# Patient Record
Sex: FEMALE | Race: WHITE | Employment: FULL TIME | URBAN - METROPOLITAN AREA
[De-identification: names, ages, dates, MRNs, and addresses within clinical notes are randomized per-mention and may not be internally consistent; named-entity substitution may affect disease eponyms.]

---

## 2017-02-03 ENCOUNTER — OFFICE VISIT (OUTPATIENT)
Dept: FAMILY MEDICINE CLINIC | Age: 47
End: 2017-02-03

## 2017-02-03 VITALS
WEIGHT: 138.6 LBS | BODY MASS INDEX: 23.09 KG/M2 | TEMPERATURE: 98.6 F | HEIGHT: 65 IN | HEART RATE: 63 BPM | OXYGEN SATURATION: 99 % | DIASTOLIC BLOOD PRESSURE: 80 MMHG | RESPIRATION RATE: 16 BRPM | SYSTOLIC BLOOD PRESSURE: 119 MMHG

## 2017-02-03 DIAGNOSIS — Z76.89 ESTABLISHING CARE WITH NEW DOCTOR, ENCOUNTER FOR: ICD-10-CM

## 2017-02-03 DIAGNOSIS — M65.4 DE QUERVAIN'S TENOSYNOVITIS, RIGHT: Primary | ICD-10-CM

## 2017-02-03 DIAGNOSIS — Z00.00 WELLNESS EXAMINATION: ICD-10-CM

## 2017-02-03 DIAGNOSIS — Z13.220 SCREENING CHOLESTEROL LEVEL: ICD-10-CM

## 2017-02-03 NOTE — PROGRESS NOTES
Chief Complaint   Patient presents with   [facility]     1. Have you been to the ER, urgent care clinic since your last visit? Hospitalized since your last visit? No    2. Have you seen or consulted any other health care providers outside of the [address] [place] [address] since your last visit? Include any pap smears or colon screening.  No

## 2017-02-03 NOTE — PATIENT INSTRUCTIONS
Madhuri Parekh Tenosynovitis: Care Instructions  Your Care Instructions  Madhuri Parekh (say \"Yenni\") tenosynovitis is a problem that makes the bottom of your thumb and the side of your wrist hurt. When you have de Quervain's, the ropey fiber (tendon) that helps move your thumb away from your fingers becomes swollen. You may have pain when you move your wrist or pick things up. You may hear a creaking sound when you move your wrist or thumb. Symptoms often get better in a few weeks with home care. Your doctor may want you to start some gentle stretching exercises once your symptoms are gone. Sometimes treatment with an injection or surgery is needed. Follow-up care is a key part of your treatment and safety. Be sure to make and go to all appointments, and call your doctor if you are having problems. It's also a good idea to know your test results and keep a list of the medicines you take. How can you care for yourself at home? · Until your symptoms are better, stop the activities that caused the pain. · Avoid moving the hand and wrist that hurt. · Follow your doctor's directions for wearing a splint to keep your thumb and wrist from moving. · Try ice or heat. ¨ Put ice or a cold pack on your thumb and wrist for 10 to 20 minutes at a time. Put a thin cloth between the ice and your skin. ¨ You can use heat for 20 to 30 minutes, 2 or 3 times a day. Try using a heating pad, hot shower, or hot pack. · Ask your doctor if you can take an over-the-counter pain medicine, such as acetaminophen (Tylenol), ibuprofen (Advil, Motrin), or naproxen (Aleve). Be safe with medicines. Read and follow all instructions on the label. When should you call for help? Watch closely for changes in your health, and be sure to contact your doctor if:  · You are not getting better after 2 weeks. Where can you learn more? Go to http://farrah-sapphire.info/.   Enter Y519 in the search box to learn more about \"De Quervain's Tenosynovitis: Care Instructions. \"  Current as of: May 23, 2016  Content Version: 11.1  © 2006-2016 [employer]. Care instructions adapted under license by [employer] (which disclaims liability or warranty for this information). If you have questions about a medical condition or this instruction, always ask your healthcare professional. [address] any warranty or liability for your use of this information. Faby Asters Disease: Exercises  Your Care Instructions  Here are some examples of typical rehabilitation exercises for your condition. Start each exercise slowly. Ease off the exercise if you start to have pain. Your doctor or your physical or occupational therapist will tell you when you can start these exercises and which ones will work best for you. How to do the exercises  Thumb lifts    1. Place your hand on a flat surface, with your palm up. 2. Lift your thumb away from your palm to make a \"C\" shape. 3. Hold for about 6 seconds. 4. Repeat 8 to 12 times. Passive thumb MP flexion    1. Hold your hand in front of you, and turn your hand so your little finger faces down and your thumb faces up. (Your hand should be in the position used for shaking someone's hand.) You may also rest your hand on a flat surface. 2. Use the fingers on your other hand to bend your thumb down at the point where your thumb connects to your palm. 3. Hold for at least 15 to 30 seconds. 4. Repeat 2 to 4 times. Finkelstein stretch    1. Hold your arms out in front of you. (Your hand should be in the position used for shaking someone's hand.)  2. Bend your thumb toward your palm. 3. Use your other hand to gently stretch your thumb and wrist downward until you feel the stretch on the thumb side of your wrist.  4. Hold for at least 15 to 30 seconds. 5. Repeat 2 to 4 times.   Resisted ulnar deviation    Note: For this exercise, you will need elastic exercise material, such as surgical tubing or Thera-Band. 1. Sit leaning forward with your legs slightly spread and your elbow on your thigh. 2. Grasp one end of the band with your palm down, and step on the other end with the foot opposite the hand holding the band. 3. Slowly bend your wrist sideways and away from your knee. 4. Repeat 8 to 12 times. Follow-up care is a key part of your treatment and safety. Be sure to make and go to all appointments, and call your doctor if you are having problems. It's also a good idea to know your test results and keep a list of the medicines you take. Where can you learn more? Go to http://farrah-sapphire.info/. Enter G131 in the search box to learn more about \"De Quervain's Disease: Exercises. \"  Current as of: May 23, 2016  Content Version: 11.1  © 1083-9933 [employer]. Care instructions adapted under license by [employer] (which disclaims liability or warranty for this information). If you have questions about a medical condition or this instruction, always ask your healthcare professional. [place] [address] any warranty or liability for your use of this information.

## 2017-02-03 NOTE — PROGRESS NOTES
Varsha Somers is an 42 y.o. female who presents to [facility] care. Radial wrist pain, leaned on it wrong some time back and has been hurting a lot recently, first started October. Not using anything for it. Works at [employer], computer doesn't seem to aggrevate it, writing does. No numbness or tingling. The pain is primarily on the thumb side of the wrist near the radial styloid and radiates both down and up. Gyn: has been to Dr. Nicholas Aguilar, last exam Oct 2014. Hx of abnomal pap HPV in her 20's, normal since. . C/s x1, finished with having children. Get mammograms, last 2015, normal.      Allergies - reviewed:   No Known Allergies      Medications - reviewed:   No current outpatient prescriptions on file. No current facility-administered medications for this visit. Past Medical History - reviewed:  History reviewed. No pertinent past medical history. Past Surgical History - reviewed:   History reviewed. No pertinent past surgical history.      Social History - reviewed:  Social History     Social History    Marital status: UNKNOWN     Spouse name: N/A    Number of children: N/A    Years of education: N/A     Occupational History    Not on file. Social History Main Topics    Smoking status: Never Smoker    Smokeless tobacco: Not on file    Alcohol use 3.6 oz/week     6 Glasses of wine per week    Drug use: No    Sexual activity: Yes     Partners: Male     Other Topics Concern    Not on file     Social History Narrative    No narrative on file         Family History - reviewed:  Family History   Problem Relation Age of Onset    Cancer Maternal Grandfather    colon cancer mom's side of family. Colonoscopy last year, normal       Immunizations - reviewed: There is no immunization history on file for this patient.       ROS  CONSTITUTIONAL: Denies: fever, chills  EYES: Denies: blurry vision, decreased vision  ENT: Denies: nasal congestion, nasal discharge  CARDIOVASCULAR: Denies: Denies: chest pain, palpitations  RESPIRATORY: Denies: shortness of breath, wheezing  GI: Denies: nausea, vomiting, diarrhea  : Denies: dysuria, frequency/urgency  NEURO: Denies: headache, rare occular migraines quarterly   SKIN: Denies: rash, itching  PSYCH: Denies: anxiety, depression      Physical Exam  Visit Vitals    /80 (BP 1 Location: Left arm, BP Patient Position: Sitting)    Pulse 63    Temp 98.6 °F (37 °C) (Oral)    Resp 16    Ht 5' 4.57\" (1.64 m)    Wt 138 lb 9.6 oz (62.9 kg)    LMP 01/10/2017    SpO2 99%    BMI 23.37 kg/m2       General appearance - alert, well appearing, and in no distress  Neck - supple, no significant adenopathy  Chest - clear to auscultation, no wheezes, rales or rhonchi, symmetric air entry  Heart - normal rate, regular rhythm, normal S1, S2, no murmurs, rubs, clicks or gallops  Abdomen - soft, nontender, nondistended, no masses or organomegaly  Neurological - alert, oriented, normal speech, no focal findings or movement disorder noted  Extremities - R wrist, hand and phalanges without swelling. There is mild TTP over prominent radial styloid. There is no thenar eminence or snuff box TTP. There is a positive finkelstein test.  Resisted pronation causes some pain for patient, resisted supination is ok. Skin - normal coloration and turgor, no rashes, no suspicious skin lesions noted      Assessment/Plan    ICD-10-CM ICD-9-CM    1. De Quervain's tenosynovitis, right M65.4 727.04    2. Screening cholesterol level Z13.220 V77.91 LIPID PANEL   3. Wellness examination Z00.00 V70.0 CBC W/O DIFF      METABOLIC PANEL, BASIC      LIPID PANEL   4. Establishing care with new doctor, encounter for Z71.89 V65.8      · In my opinion, wrist pain is classic for Geremias Brothers. Will treat with NSAIDs, targeted home exercises, ice, avoiding overuse. May want to get a splint.   If symptoms persist would recommend evaluation by sports medicine - provided names of clinicians here. · Will check labs today, pt to make appointment for [facility] to go over labs. Follow-up Disposition: Not on File      I have discussed the diagnosis with the patient and the intended plan as seen in the above orders. The patient has received an after-visit summary and questions were answered concerning future plans. I have discussed medication side effects and warnings with the patient as well.       Maryann Baker, DO

## 2017-02-03 NOTE — MR AVS SNAPSHOT
Visit Information Date & Time Provider Department Dept. Phone Encounter #  
 2/3/2017  1:30 PM Stanley Grubbs, Lakshmi [facility] [phone] [id number] Upcoming Health Maintenance Date Due DTaP/Tdap/Td series (1 - Tdap) 10/4/1993 PAP AKA CERVICAL CYTOLOGY 10/4/1993 INFLUENZA AGE 9 TO ADULT 8/1/2016 Allergies as of 2/3/2017  Review Complete On: 2/3/2017 By: Stanley Grubbs, DO No Known Allergies Current Immunizations  Never Reviewed No immunizations on file. Not reviewed this visit Vitals BP Pulse Temp Resp Height(growth percentile) Weight(growth percentile) 119/80 (BP 1 Location: Left arm, BP Patient Position: Sitting) 63 98.6 °F (37 °C) (Oral) 16 5' 4.57\" (1.64 m) 138 lb 9.6 oz (62.9 kg) LMP SpO2 BMI OB Status Smoking Status 01/10/2017 99% 23.37 kg/m2 Having regular periods Never Smoker BMI and BSA Data Body Mass Index Body Surface Area  
 23.37 kg/m 2 1.69 m 2 Preferred Pharmacy Pharmacy Name Phone [id number] [facility], [address] [phone] Your Updated Medication List  
  
Notice  As of 2/3/2017  2:33 PM  
 You have not been prescribed any medications. Patient Instructions Mckenna Guerra Tenosynovitis: Care Instructions Your Care Instructions Mckenna Guerra (say \"Yenni\") tenosynovitis is a problem that makes the bottom of your thumb and the side of your wrist hurt. When you have de Quervain's, the ropey fiber (tendon) that helps move your thumb away from your fingers becomes swollen. You may have pain when you move your wrist or pick things up. You may hear a creaking sound when you move your wrist or thumb. Symptoms often get better in a few weeks with home care. Your doctor may want you to start some gentle stretching exercises once your symptoms are gone. Sometimes treatment with an injection or surgery is needed. Follow-up care is a key part of your treatment and safety. Be sure to make and go to all appointments, and call your doctor if you are having problems. It's also a good idea to know your test results and keep a list of the medicines you take. How can you care for yourself at home? · Until your symptoms are better, stop the activities that caused the pain. · Avoid moving the hand and wrist that hurt. · Follow your doctor's directions for wearing a splint to keep your thumb and wrist from moving. · Try ice or heat. ¨ Put ice or a cold pack on your thumb and wrist for 10 to 20 minutes at a time. Put a thin cloth between the ice and your skin. ¨ You can use heat for 20 to 30 minutes, 2 or 3 times a day. Try using a heating pad, hot shower, or hot pack. · Ask your doctor if you can take an over-the-counter pain medicine, such as acetaminophen (Tylenol), ibuprofen (Advil, Motrin), or naproxen (Aleve). Be safe with medicines. Read and follow all instructions on the label. When should you call for help? Watch closely for changes in your health, and be sure to contact your doctor if: 
· You are not getting better after 2 weeks. Where can you learn more? Go to http://farrah-sapphire.info/. Enter Q578 in the search box to learn more about \"De Quervain's Tenosynovitis: Care Instructions. \" Current as of: May 23, 2016 Content Version: 11.1 © 8689-7046 InvenSense, Incorporated. Care instructions adapted under license by [employer]rose (which disclaims liability or warranty for this information). If you have questions about a medical condition or this instruction, always ask your healthcare professional. [address] any warranty or liability for your use of this information. Nora Matt Disease: Exercises Your Care Instructions Here are some examples of typical rehabilitation exercises for your condition. Start each exercise slowly. Ease off the exercise if you start to have pain. Your doctor or your physical or occupational therapist will tell you when you can start these exercises and which ones will work best for you. How to do the exercises Thumb lifts 1. Place your hand on a flat surface, with your palm up. 2. Lift your thumb away from your palm to make a \"C\" shape. 3. Hold for about 6 seconds. 4. Repeat 8 to 12 times. Passive thumb MP flexion 1. Hold your hand in front of you, and turn your hand so your little finger faces down and your thumb faces up. (Your hand should be in the position used for shaking someone's hand.) You may also rest your hand on a flat surface. 2. Use the fingers on your other hand to bend your thumb down at the point where your thumb connects to your palm. 3. Hold for at least 15 to 30 seconds. 4. Repeat 2 to 4 times. Finkelstein stretch 1. Hold your arms out in front of you. (Your hand should be in the position used for shaking someone's hand.) 2. Bend your thumb toward your palm. 3. Use your other hand to gently stretch your thumb and wrist downward until you feel the stretch on the thumb side of your wrist. 
4. Hold for at least 15 to 30 seconds. 5. Repeat 2 to 4 times. Resisted ulnar deviation Note: For this exercise, you will need elastic exercise material, such as surgical tubing or Thera-Band. 1. Sit leaning forward with your legs slightly spread and your elbow on your thigh. 2. Grasp one end of the band with your palm down, and step on the other end with the foot opposite the hand holding the band. 3. Slowly bend your wrist sideways and away from your knee. 4. Repeat 8 to 12 times. Follow-up care is a key part of your treatment and safety. Be sure to make and go to all appointments, and call your doctor if you are having problems. It's also a good idea to know your test results and keep a list of the medicines you take. Where can you learn more? Go to http://farrah-sapphire.info/. Enter J190 in the search box to learn more about \"De Quervain's Disease: Exercises. \" Current as of: May 23, 2016 Content Version: 11.1 © 9722-2678 Explorer.io, Incorporated. Care instructions adapted under license by [employer]as (which disclaims liability or warranty for this information). If you have questions about a medical condition or this instruction, always ask your healthcare professional. [address] any warranty or liability for your use of this information. Introducing [facility]! Elina Singh introduces Market Track patient portal. Now you can access parts of your medical record, email your doctor's office, and request medication refills online. 1. In your internet browser, go to [email or web address] 2. Click on the First Time User? Click Here link in the Sign In box. You will see the New Member Sign Up page. 3. Enter your Market Track Access Code exactly as it appears below. You will not need to use this code after youve completed the sign-up process. If you do not sign up before the expiration date, you must request a new code. · Market Track Access Code: [email or web address] Expires: 5/4/2017  1:50 PM 
 
4. Enter the last four digits of your Social Security Number (xxxx) and Date of Birth (mm/dd/yyyy) as indicated and click Submit. You will be taken to the next sign-up page. 5. Create a Practice Fusiont ID. This will be your Market Track login ID and cannot be changed, so think of one that is secure and easy to remember. 6. Create a Market Track password. You can change your password at any time. 7. Enter your Password Reset Question and Answer. This can be used at a later time if you forget your password. 8. Enter your e-mail address. You will receive e-mail notification when new information is available in [address] 9. Click Sign Up. You can now view and download portions of your medical record. 10. Click the Download Summary menu link to download a portable copy of your medical information. If you have questions, please visit the Frequently Asked Questions section of the [employer] website. Remember, [employer] is NOT to be used for urgent needs. For medical emergencies, dial 911. Now available from your iPhone and Android! Please provide this summary of care documentation to your next provider. If you have any questions after today's visit, please call [phone].

## 2017-02-04 LAB
BUN SERPL-MCNC: 15 MG/DL (ref 6–24)
BUN/CREAT SERPL: 18 (ref 9–23)
CALCIUM SERPL-MCNC: 9.7 MG/DL (ref 8.7–10.2)
CHLORIDE SERPL-SCNC: 101 MMOL/L (ref 96–106)
CHOLEST SERPL-MCNC: 157 MG/DL (ref 100–199)
CO2 SERPL-SCNC: 24 MMOL/L (ref 18–29)
CREAT SERPL-MCNC: 0.82 MG/DL (ref 0.57–1)
ERYTHROCYTE [DISTWIDTH] IN BLOOD BY AUTOMATED COUNT: 12.8 % (ref 12.3–15.4)
GLUCOSE SERPL-MCNC: 91 MG/DL (ref 65–99)
HCT VFR BLD AUTO: 40.7 % (ref 34–46.6)
HDLC SERPL-MCNC: 76 MG/DL
HGB BLD-MCNC: 14.4 G/DL (ref 11.1–15.9)
INTERPRETATION, 910389: NORMAL
LDLC SERPL CALC-MCNC: 64 MG/DL (ref 0–99)
MCH RBC QN AUTO: 32.1 PG (ref 26.6–33)
MCHC RBC AUTO-ENTMCNC: 35.4 G/DL (ref 31.5–35.7)
MCV RBC AUTO: 91 FL (ref 79–97)
PLATELET # BLD AUTO: 170 X10E3/UL (ref 150–379)
POTASSIUM SERPL-SCNC: 4.3 MMOL/L (ref 3.5–5.2)
RBC # BLD AUTO: 4.48 X10E6/UL (ref 3.77–5.28)
SODIUM SERPL-SCNC: 140 MMOL/L (ref 134–144)
TRIGL SERPL-MCNC: 85 MG/DL (ref 0–149)
VLDLC SERPL CALC-MCNC: 17 MG/DL (ref 5–40)
WBC # BLD AUTO: 8.5 X10E3/UL (ref 3.4–10.8)

## 2017-03-21 ENCOUNTER — OFFICE VISIT (OUTPATIENT)
Dept: FAMILY MEDICINE CLINIC | Age: 47
End: 2017-03-21

## 2017-03-21 ENCOUNTER — HOSPITAL ENCOUNTER (OUTPATIENT)
Dept: LAB | Age: 47
Discharge: HOME | End: 2017-03-21
Payer: COMMERCIAL

## 2017-03-21 VITALS
SYSTOLIC BLOOD PRESSURE: 113 MMHG | TEMPERATURE: 97.7 F | OXYGEN SATURATION: 100 % | WEIGHT: 136.8 LBS | RESPIRATION RATE: 17 BRPM | BODY MASS INDEX: 22.79 KG/M2 | DIASTOLIC BLOOD PRESSURE: 76 MMHG | HEART RATE: 76 BPM | HEIGHT: 65 IN

## 2017-03-21 DIAGNOSIS — Z23 ENCOUNTER FOR IMMUNIZATION: ICD-10-CM

## 2017-03-21 DIAGNOSIS — Z01.419 WELL WOMAN EXAM WITH ROUTINE GYNECOLOGICAL EXAM: Primary | ICD-10-CM

## 2017-03-21 DIAGNOSIS — Z12.39 BREAST CANCER SCREENING: ICD-10-CM

## 2017-03-21 PROCEDURE — 88175 CYTOPATH C/V AUTO FLUID REDO: CPT | Performed by: FAMILY MEDICINE

## 2017-03-21 PROCEDURE — 87624 HPV HI-RISK TYP POOLED RSLT: CPT | Performed by: FAMILY MEDICINE

## 2017-03-21 NOTE — PATIENT INSTRUCTIONS

## 2017-03-21 NOTE — PROGRESS NOTES
HPI:  Esmer Haskins is a 42 y.o. female presenting for well woman exam.     Chase Byrd. Hx of abnomal pap HPV in her 20's, normal since. C/s x1, finished with having children. Gets mammograms, last April 2015, normal.   Last menstrual period was 3/13/17  Length of periods: 7 days  Menstrual flow: medium to heavy  Sexually active?: yes    Diet: pretty healthy  Exercise: goes to the gym, does elliptical and classes at the gym    DeQuervain symptoms are improved after last visit. Allergies- reviewed:   No Known Allergies      Medications- reviewed:   No current outpatient prescriptions on file. No current facility-administered medications for this visit. Past Medical History- reviewed:  History reviewed. No pertinent past medical history. Past Surgical History- reviewed:   History reviewed. No pertinent surgical history. Family History - reviewed:  Family History   Problem Relation Age of Onset    Cancer Maternal Grandfather          Social History - reviewed:  Social History     Social History    Marital status: UNKNOWN     Spouse name: N/A    Number of children: N/A    Years of education: N/A     Occupational History    Not on file.      Social History Main Topics    Smoking status: Never Smoker    Smokeless tobacco: Not on file    Alcohol use 3.6 oz/week     6 Glasses of wine per week    Drug use: No    Sexual activity: Yes     Partners: Male     Other Topics Concern    Not on file     Social History Narrative         Immunizations - reviewed:   Immunization History   Administered Date(s) Administered    Tdap 03/21/2017     Flu: got one this year   Tdap: about 10 years ago      Health Maintenance reviewed -  Pap smear today  Mammogram April Colonoscopy last year, normal  HIV testing when pregnant  Hepatitis C testing when pregnant  Lung cancer screening N/A    Review of Systems   CONSTITUTIONAL: Denies: fever, chills  EYES: Denies: blurry vision, decreased vision  RESPIRATORY: Denies: cough, shortness of breath  GI: Denies: nausea, vomiting, diarrhea, constipation  : Denies: dysuria, frequency/urgency  PSYCH: Denies: anxiety, depression  BREASTS: No masses or nipple discharge      Physical Exam  Visit Vitals    /76 (BP 1 Location: Right arm, BP Patient Position: Sitting)    Pulse 76    Temp 97.7 °F (36.5 °C) (Oral)    Resp 17    Ht 5' 4.57\" (1.64 m)    Wt 136 lb 12.8 oz (62.1 kg)    LMP 03/13/2017    SpO2 100%    BMI 23.07 kg/m2       General appearance - alert, well appearing, and in no distress  Eyes - pupils equal and reactive, extraocular eye movements intact  Ears - bilateral TM's and external ear canals normal  Nose - normal and patent, no erythema, discharge or polyps  Mouth - mucous membranes moist, pharynx normal without lesions  Neck - supple, no significant adenopathy  Chest - clear to auscultation, no wheezes, rales or rhonchi, symmetric air entry  Heart - normal rate, regular rhythm, normal S1, S2, no murmurs, rubs, clicks or gallops  Abdomen - soft, nontender, nondistended, no masses or organomegaly  Neurological - alert, oriented, normal speech, no focal findings or movement disorder noted  Musculoskeletal - no joint tenderness, deformity or swelling  Extremities - peripheral pulses normal, no pedal edema, no clubbing or cyanosis  Pelvic - Exam chaperoned by Barry Lovelace LPN. External genitalia normal without rashes or lesions. Pink and moist vaginal mucosa. Scant blood tinged discharge. Cervix without lesions or abnormal discharge. Uterus non tender and normal size. No adnexal masses or tenderness. Breast - deferred      Assessment/Plan:    ICD-10-CM ICD-9-CM    1. Well woman exam with routine gynecological exam Z01.419 V72.31 [facility] MAMMO BI SCREENING INCL CAD      PAP IG, APTIMA HPV AND RFX 16/18,45 ([id number])   2. Breast cancer screening Z12.39 V76.10 [facility] MAMMO BI SCREENING INCL CAD   3.  Encounter for immunization Z23 V03.89 TETANUS, DIPHTHERIA TOXOIDS AND ACELLULAR PERTUSSIS VACCINE (TDAP), IN INDIVIDS. >=7, IM     · Counseled re: diet, exercise, healthy lifestyle  · Appropriate labs, vaccines, imaging studies, and referrals ordered as listed above  · Jorge form filled out for work  · Mammogram ordered today. Breast exam deferred. The American Cancer Society recommends not performing clinical breast examination, given the potential for false-positive findings and lack of evidence for improved outcomes. The USPSTF states that evidence is insufficient to assess additional benefits of clinical breast examination beyond mammography. Follow-up Disposition: Not on File      I have discussed the diagnosis with the patient and the intended plan as seen in the above orders. The patient has received an after-visit summary and questions were answered concerning future plans. I have discussed medication side effects and warnings with the patient as well. Informed pt to return to the office if new symptoms arise.       Maryann Baker, DO

## 2017-03-21 NOTE — MR AVS SNAPSHOT
Visit Information Date & Time Provider Department Dept. Phone Encounter #  
 3/21/2017  9:00 AM Shell Robles, [facility] [phone] [id number] Upcoming Health Maintenance Date Due DTaP/Tdap/Td series (1 - Tdap) 10/4/1993 PAP AKA CERVICAL CYTOLOGY 10/4/1993 INFLUENZA AGE 9 TO ADULT 8/1/2016 Allergies as of 3/21/2017  Review Complete On: 3/21/2017 By: Shell Robles,  No Known Allergies Current Immunizations  Never Reviewed No immunizations on file. Not reviewed this visit You Were Diagnosed With   
  
 Codes Comments Breast cancer screening    -  Primary ICD-10-CM: Z12.39 
ICD-9-CM: V76.10 Well woman exam with routine gynecological exam     ICD-10-CM: S37.383 ICD-9-CM: V72.31 Vitals BP Pulse Temp Resp Height(growth percentile) Weight(growth percentile) 113/76 (BP 1 Location: Right arm, BP Patient Position: Sitting) 76 97.7 °F (36.5 °C) (Oral) 17 5' 4.57\" (1.64 m) 136 lb 12.8 oz (62.1 kg) LMP SpO2 BMI OB Status Smoking Status 03/13/2017 100% 23.07 kg/m2 Having regular periods Never Smoker Vitals History BMI and BSA Data Body Mass Index Body Surface Area 23.07 kg/m 2 1.68 m 2 Preferred Pharmacy Pharmacy Name Phone [id number] [address] [facility], [address] [place] [address] Road [phone] Your Updated Medication List  
  
Notice  As of 3/21/2017  9:19 AM  
 You have not been prescribed any medications. To-Do List   
 03/21/2017 Imaging:  DC MAMMO BI SCREENING INCL CAD Patient Instructions Well Visit, Ages 25 to 48: Care Instructions Your Care Instructions Physical exams can help you stay healthy. Your doctor has checked your overall health and may have suggested ways to take good care of yourself. He or she also may have recommended tests. At home, you can help prevent illness with healthy eating, regular exercise, and other steps. Follow-up care is a key part of your treatment and safety. Be sure to make and go to all appointments, and call your doctor if you are having problems. It's also a good idea to know your test results and keep a list of the medicines you take. How can you care for yourself at home? · Reach and stay at a healthy weight. This will lower your risk for many problems, such as obesity, diabetes, heart disease, and high blood pressure. · Get at least 30 minutes of physical activity on most days of the week. Walking is a good choice. You also may want to do other activities, such as running, swimming, cycling, or playing tennis or team sports. Discuss any changes in your exercise program with your doctor. · Do not smoke or allow others to smoke around you. If you need help quitting, talk to your doctor about stop-smoking programs and medicines. These can increase your chances of quitting for good. · Talk to your doctor about whether you have any risk factors for sexually transmitted infections (STIs). Having one sex partner (who does not have STIs and does not have sex with anyone else) is a good way to avoid these infections. · Use birth control if you do not want to have children at this time. Talk with your doctor about the choices available and what might be best for you. · Protect your skin from too much sun. When you're outdoors from 10 a.m. to 4 p.m., stay in the shade or cover up with clothing and a hat with a wide brim. Wear sunglasses that block UV rays. Even when it's cloudy, put broad-spectrum sunscreen (SPF 30 or higher) on any exposed skin. · See a dentist one or two times a year for checkups and to have your teeth cleaned. · Wear a seat belt in the car. · Drink alcohol in moderation, if at all. That means no more than 2 drinks a day for men and 1 drink a day for women. Follow your doctor's advice about when to have certain tests. These tests can spot problems early. For everyone · Cholesterol. Have the fat (cholesterol) in your blood tested after age 21. Your doctor will tell you how often to have this done based on your age, family history, or other things that can increase your risk for heart disease. · Blood pressure. Have your blood pressure checked during a routine doctor visit. Your doctor will tell you how often to check your blood pressure based on your age, your blood pressure results, and other factors. · Vision. Talk with your doctor about how often to have a glaucoma test. 
· Diabetes. Ask your doctor whether you should have tests for diabetes. · Colon cancer. Have a test for colon cancer at age 48. You may have one of several tests. If you are younger than 48, you may need a test earlier if you have any risk factors. Risk factors include whether you already had a precancerous polyp removed from your colon or whether your parent, brother, sister, or child has had colon cancer. For women · Breast exam and mammogram. Talk to your doctor about when you should have a clinical breast exam and a mammogram. Medical experts differ on whether and how often women under 50 should have these tests. Your doctor can help you decide what is right for you. · Pap test and pelvic exam. Begin Pap tests at age 24. A Pap test is the best way to find cervical cancer. The test often is part of a pelvic exam. Ask how often to have this test. 
· Tests for sexually transmitted infections (STIs). Ask whether you should have tests for STIs. You may be at risk if you have sex with more than one person, especially if your partners do not wear condoms. For men · Tests for sexually transmitted infections (STIs). Ask whether you should have tests for STIs. You may be at risk if you have sex with more than one person, especially if you do not wear a condom. · Testicular cancer exam. Ask your doctor whether you should check your testicles regularly. · Prostate exam. Talk to your doctor about whether you should have a blood test (called a PSA test) for prostate cancer. Experts differ on whether and when men should have this test. Some experts suggest it if you are older than 39 and are -American or have a father or brother who got prostate cancer when he was younger than 72. When should you call for help? Watch closely for changes in your health, and be sure to contact your doctor if you have any problems or symptoms that concern you. Where can you learn more? Go to http://farrah-sapphire.info/. Enter P072 in the search box to learn more about \"Well Visit, Ages 25 to 48: Care Instructions. \" Current as of: July 19, 2016 Content Version: 11.1 © 7218-0675 Roommate[employer]. Care instructions adapted under license by [employer] (which disclaims liability or warranty for this information). If you have questions about a medical condition or this instruction, always ask your healthcare professional. [place] [address] any warranty or liability for your use of this information. Introducing \[facility]\"" & HEALTH SERVICES! Anabelle Vallejo introduces [employer] patient portal. Now you can access parts of your medical record, email your doctor's office, and request medication refills online. 1. In your internet browser, go to [email or web address] 2. Click on the First Time User? Click Here link in the Sign In box. You will see the New Member Sign Up page. 3. Enter your Predict[employer] Access Code exactly as it appears below. You will not need to use this code after youve completed the sign-up process. If you do not sign up before the expiration date, you must request a new code. · [employer] Access Code: [email or web address] Expires: 5/4/2017  2:50 PM 
 
4. Enter the last four digits of your Social Security Number (xxxx) and Date of Birth (mm/dd/yyyy) as indicated and click Submit.  You will be taken to the next sign-up page. 5. Create a motionID technologies ID. This will be your motionID technologies login ID and cannot be changed, so think of one that is secure and easy to remember. 6. Create a motionID technologies password. You can change your password at any time. 7. Enter your Password Reset Question and Answer. This can be used at a later time if you forget your password. 8. Enter your e-mail address. You will receive e-mail notification when new information is available in [address]. 9. Click Sign Up. You can now view and download portions of your medical record. 10. Click the Download Summary menu link to download a portable copy of your medical information. If you have questions, please visit the Frequently Asked Questions section of the motionID technologies website. Remember, motionID technologies is NOT to be used for urgent needs. For medical emergencies, dial 911. Now available from your iPhone and Android! Please provide this summary of care documentation to your next provider. If you have any questions after today's visit, please call [phone].

## 2017-04-03 NOTE — PROGRESS NOTES
NILM, HPV neg, next pap 5 years per currently ASCCP guidelines. Letter sent to patient with results.

## 2018-03-09 ENCOUNTER — OFFICE VISIT (OUTPATIENT)
Dept: FAMILY MEDICINE CLINIC | Age: 48
End: 2018-03-09

## 2018-03-09 VITALS
RESPIRATION RATE: 16 BRPM | SYSTOLIC BLOOD PRESSURE: 118 MMHG | HEART RATE: 61 BPM | OXYGEN SATURATION: 99 % | TEMPERATURE: 97.9 F | WEIGHT: 136 LBS | DIASTOLIC BLOOD PRESSURE: 73 MMHG | BODY MASS INDEX: 22.66 KG/M2 | HEIGHT: 65 IN

## 2018-03-09 DIAGNOSIS — Z12.39 SCREENING FOR BREAST CANCER: ICD-10-CM

## 2018-03-09 DIAGNOSIS — L23.7 POISON IVY: Primary | ICD-10-CM

## 2018-03-09 RX ORDER — PREDNISONE 20 MG/1
TABLET ORAL
Qty: 30 TAB | Refills: 0 | Status: SHIPPED | OUTPATIENT
Start: 2018-03-09

## 2018-03-09 NOTE — PROGRESS NOTES
Chief Complaint   Patient presents with    Rash     X10 days, on both arms,itching,red     1. Have you been to the ER, urgent care clinic since your last visit? Hospitalized since your last visit? No    2. Have you seen or consulted any other health care providers outside of the [address] [place] [address] since your last visit? Include any pap smears or colon screening.  no

## 2018-03-09 NOTE — PROGRESS NOTES
Subjective  Marylou Vergara is an 41 y.o. female who presents for rash. Started 10 days after after working in the yard. Is spreading. Mostly on arms but also a little on abdomen and face. Very itchy. Benadryl and calamine helps some.  had a little but his has resolved. No fever. Sometimes looks blistery. Allergies - reviewed:   No Known Allergies      Medications - reviewed:   Current Outpatient Prescriptions   Medication Sig    predniSONE (DELTASONE) 20 mg tablet Take three tabs per day for five days then take two tabs per day for five days then take 1 tabs per day for five day. No current facility-administered medications for this visit. Past Medical History - reviewed:  History reviewed. No pertinent past medical history. Past Surgical History - reviewed:   History reviewed. No pertinent surgical history. Social History - reviewed:  Social History     Social History    Marital status: UNKNOWN     Spouse name: N/A    Number of children: N/A    Years of education: N/A     Occupational History    Not on file.      Social History Main Topics    Smoking status: Never Smoker    Smokeless tobacco: Never Used    Alcohol use 3.6 oz/week     6 Glasses of wine per week    Drug use: No    Sexual activity: Yes     Partners: Male     Other Topics Concern    Not on file     Social History Narrative         Family History - reviewed:  Family History   Problem Relation Age of Onset    Cancer Maternal Grandfather          Immunizations - reviewed:   Immunization History   Administered Date(s) Administered    Tdap 03/21/2017       ROS  CONSTITUTIONAL: Denies: fever, chills  SKIN: rash, itching      Physical Exam  Visit Vitals    /73    Pulse 61    Temp 97.9 °F (36.6 °C) (Oral)    Resp 16    Ht 5' 4.57\" (1.64 m)    Wt 136 lb (61.7 kg)    LMP 03/08/2018    SpO2 99%    BMI 22.93 kg/m2       General appearance - alert, well appearing, and in no distress  Skin - B/L arms with multiple erythematous papules, a few vesicles. Some lesions have scarred over. No surrounding erythema or signs of cellulitis. Assessment/Plan    ICD-10-CM ICD-9-CM    1. Poison ivy L23.7 692.6 predniSONE (DELTASONE) 20 mg tablet   2. Screening for breast cancer Z12.31 V76.10 DC MAMMO BI SCREENING INCL CAD     · Will treat as poison ivy with PO steroids given the spreading. Counseled on common side effects. · RTC for any worsening symptoms or further concerns. Follow-up Disposition: Not on File      I have discussed the diagnosis with the patient and the intended plan as seen in the above orders. The patient has received an after-visit summary and questions were answered concerning future plans. I have discussed medication side effects and warnings with the patient as well.       Maryann Baker, DO

## 2018-03-09 NOTE — MR AVS SNAPSHOT
[address] [place] [phone] Patient: Yeni Santana MRN: [id number] :1970 Visit Information Date & Time Provider Department Dept. Phone Encounter #  
 3/9/2018  2:30 PM Peace Singh DO [address] [facility] [phone] [id number] Upcoming Health Maintenance Date Due Influenza Age 5 to Adult 2017 PAP AKA CERVICAL CYTOLOGY 3/21/2020 DTaP/Tdap/Td series (2 - Td) 3/21/2027 Allergies as of 3/9/2018  Review Complete On: 3/9/2018 By: Peace Singh DO No Known Allergies Current Immunizations  Never Reviewed Name Date Tdap 3/21/2017 Not reviewed this visit You Were Diagnosed With   
  
 Codes Comments Poison ivy    -  Primary ICD-10-CM: L23.7 ICD-9-CM: 692.6 Screening for breast cancer     ICD-10-CM: Z12.31 
ICD-9-CM: V76.10 Vitals BP Pulse Temp Resp Height(growth percentile) Weight(growth percentile) 118/73 61 97.9 °F (36.6 °C) (Oral) 16 5' 4.57\" (1.64 m) 136 lb (61.7 kg) LMP SpO2 BMI OB Status Smoking Status 2018 99% 22.93 kg/m2 Having regular periods Never Smoker Vitals History BMI and BSA Data Body Mass Index Body Surface Area  
 22.93 kg/m 2 1.68 m 2 Preferred Pharmacy Pharmacy Name Phone [id number] [phone] [facility] [address], [address] [phone] Your Updated Medication List  
  
   
This list is accurate as of 3/9/18  4:33 PM.  Always use your most recent med list.  
  
  
  
  
 predniSONE 20 mg tablet Commonly known as:  Burnard Lauren Take three tabs per day for five days then take two tabs per day for five days then take 1 tabs per day for five day. Prescriptions Sent to Pharmacy Refills  
 predniSONE (DELTASONE) 20 mg tablet 0  Sig: Take three tabs per day for five days then take two tabs per day for five days then take 1 tabs per day for five day. Class: Normal  
 Pharmacy: [facility], [address] Ph #: [phone] To-Do List   
 03/09/2018 Imaging:  DC MAMMO BI SCREENING INCL CAD Introducing [facility] & HEALTH SERVICES! Butch Saenz introduces Slicebooks patient portal. Now you can access parts of your medical record, email your doctor's office, and request medication refills online. 1. In your internet browser, go to [email or web address] 2. Click on the First Time User? Click Here link in the Sign In box. You will see the New Member Sign Up page. 3. Enter your Slicebooks Access Code exactly as it appears below. You will not need to use this code after youve completed the sign-up process. If you do not sign up before the expiration date, you must request a new code. · Slicebooks Access Code: [email or web address] Expires: 6/7/2018  2:51 PM 
 
4. Enter the last four digits of your Social Security Number (xxxx) and Date of Birth (mm/dd/yyyy) as indicated and click Submit. You will be taken to the next sign-up page. 5. Create a Slicebooks ID. This will be your Slicebooks login ID and cannot be changed, so think of one that is secure and easy to remember. 6. Create a Slicebooks password. You can change your password at any time. 7. Enter your Password Reset Question and Answer. This can be used at a later time if you forget your password. 8. Enter your e-mail address. You will receive e-mail notification when new information is available in [address] 9. Click Sign Up. You can now view and download portions of your medical record. 10. Click the Download Summary menu link to download a portable copy of your medical information. If you have questions, please visit the Frequently Asked Questions section of the Slicebooks website. Remember, Slicebooks is NOT to be used for urgent needs. For medical emergencies, dial 911. Now available from your iPhone and Android! Please provide this summary of care documentation to your next provider. If you have any questions after today's visit, please call [phone].

## 2018-04-12 ENCOUNTER — HOSPITAL ENCOUNTER (OUTPATIENT)
Dept: MAMMOGRAPHY | Age: 48
Discharge: HOME | End: 2018-04-12
Attending: FAMILY MEDICINE
Payer: COMMERCIAL

## 2018-04-12 DIAGNOSIS — Z12.39 SCREENING FOR BREAST CANCER: ICD-10-CM

## 2018-04-12 PROCEDURE — 77067 SCR MAMMO BI INCL CAD: CPT

## 2019-09-27 ENCOUNTER — HOSPITAL ENCOUNTER (OUTPATIENT)
Dept: MAMMOGRAPHY | Age: 49
Discharge: HOME | End: 2019-09-27
Attending: FAMILY MEDICINE
Payer: COMMERCIAL

## 2019-09-27 DIAGNOSIS — Z12.39 BREAST SCREENING: ICD-10-CM

## 2019-09-27 PROCEDURE — 77067 SCR MAMMO BI INCL CAD: CPT

## 2022-06-06 ENCOUNTER — OFFICE VISIT (OUTPATIENT)
Dept: OBSTETRICS AND GYNECOLOGY | Facility: CLINIC | Age: 52
End: 2022-06-06
Payer: COMMERCIAL

## 2022-06-06 VITALS
BODY MASS INDEX: 24.59 KG/M2 | DIASTOLIC BLOOD PRESSURE: 80 MMHG | HEIGHT: 64 IN | SYSTOLIC BLOOD PRESSURE: 122 MMHG | WEIGHT: 144 LBS

## 2022-06-06 DIAGNOSIS — N87.0 DYSPLASIA OF CERVIX, LOW GRADE (CIN 1): ICD-10-CM

## 2022-06-06 DIAGNOSIS — Z12.4 PAP SMEAR FOR CERVICAL CANCER SCREENING: ICD-10-CM

## 2022-06-06 DIAGNOSIS — Z01.419 ENCOUNTER FOR ANNUAL ROUTINE GYNECOLOGICAL EXAMINATION: Primary | ICD-10-CM

## 2022-06-06 PROCEDURE — 3008F BODY MASS INDEX DOCD: CPT | Mod: S$GLB,,, | Performed by: OBSTETRICS & GYNECOLOGY

## 2022-06-06 PROCEDURE — 1160F PR REVIEW ALL MEDS BY PRESCRIBER/CLIN PHARMACIST DOCUMENTED: ICD-10-PCS | Mod: S$GLB,,, | Performed by: OBSTETRICS & GYNECOLOGY

## 2022-06-06 PROCEDURE — 3079F PR MOST RECENT DIASTOLIC BLOOD PRESSURE 80-89 MM HG: ICD-10-PCS | Mod: S$GLB,,, | Performed by: OBSTETRICS & GYNECOLOGY

## 2022-06-06 PROCEDURE — 1160F RVW MEDS BY RX/DR IN RCRD: CPT | Mod: S$GLB,,, | Performed by: OBSTETRICS & GYNECOLOGY

## 2022-06-06 PROCEDURE — 99386 PR PREVENTIVE VISIT,NEW,40-64: ICD-10-PCS | Mod: S$GLB,,, | Performed by: OBSTETRICS & GYNECOLOGY

## 2022-06-06 PROCEDURE — 3008F PR BODY MASS INDEX (BMI) DOCUMENTED: ICD-10-PCS | Mod: S$GLB,,, | Performed by: OBSTETRICS & GYNECOLOGY

## 2022-06-06 PROCEDURE — 1159F PR MEDICATION LIST DOCUMENTED IN MEDICAL RECORD: ICD-10-PCS | Mod: S$GLB,,, | Performed by: OBSTETRICS & GYNECOLOGY

## 2022-06-06 PROCEDURE — 99386 PREV VISIT NEW AGE 40-64: CPT | Mod: S$GLB,,, | Performed by: OBSTETRICS & GYNECOLOGY

## 2022-06-06 PROCEDURE — 3079F DIAST BP 80-89 MM HG: CPT | Mod: S$GLB,,, | Performed by: OBSTETRICS & GYNECOLOGY

## 2022-06-06 PROCEDURE — 3074F PR MOST RECENT SYSTOLIC BLOOD PRESSURE < 130 MM HG: ICD-10-PCS | Mod: S$GLB,,, | Performed by: OBSTETRICS & GYNECOLOGY

## 2022-06-06 PROCEDURE — 88175 CYTOPATH C/V AUTO FLUID REDO: CPT | Performed by: OBSTETRICS & GYNECOLOGY

## 2022-06-06 PROCEDURE — 3074F SYST BP LT 130 MM HG: CPT | Mod: S$GLB,,, | Performed by: OBSTETRICS & GYNECOLOGY

## 2022-06-06 PROCEDURE — 1159F MED LIST DOCD IN RCRD: CPT | Mod: S$GLB,,, | Performed by: OBSTETRICS & GYNECOLOGY

## 2022-06-06 NOTE — PROGRESS NOTES
"Annual gyn exam    HPI:  Rina Garrett is a 51 y.o. female  presents for a well woman exam.  LMP: Patient's last menstrual period was 2022 (exact date)..  No issues, problems, or complaints.  She has become oligomenorrhea now, with maybe 2 periods in the last 6 months.  Last year she had 3 or 4. She states that she is having some sweats but that her symptoms are not bad.    Past Medical History:   Diagnosis Date    Abnormal Pap smear of cervix     ROWDY 1, HPV, cryotherapy     Past Surgical History:   Procedure Laterality Date     SECTION      LAPAROSCOPY       Social History     Socioeconomic History    Marital status:    Tobacco Use    Smoking status: Never Smoker    Smokeless tobacco: Never Used   Substance and Sexual Activity    Alcohol use: Yes    Sexual activity: Yes     Family History   Problem Relation Age of Onset    Colon cancer Maternal Grandfather     Breast cancer Maternal Aunt     Ovarian cancer Neg Hx     Uterine cancer Neg Hx      OB History        2    Para   2    Term   1       1    AB        Living   2       SAB        IAB        Ectopic        Multiple        Live Births                     /80 (BP Location: Right arm, Patient Position: Sitting)   Ht 5' 4" (1.626 m)   Wt 65.3 kg (144 lb)   LMP 2022 (Exact Date)   BMI 24.72 kg/m²     ROS:  GENERAL: Denies weight gain or weight loss. Feeling well overall.   SKIN: Denies rash or lesions.   HEAD: Denies head injury or headache.   NODES: Denies enlarged lymph nodes.   CHEST: Denies chest pain or shortness of breath.   CARDIOVASCULAR: Denies palpitations or left sided chest pain.   ABDOMEN: No abdominal pain, constipation, diarrhea, nausea, vomiting or rectal bleeding.   URINARY: No frequency, dysuria, hematuria, or burning on urination.  REPRODUCTIVE: See HPI.   BREASTS: The patient performs breast self-examination and denies pain, lumps, or nipple discharge.   HEMATOLOGIC: No easy " bruisability or excessive bleeding.   MUSCULOSKELETAL: Denies joint pain or swelling.   NEUROLOGIC: Denies syncope or weakness.   PSYCHIATRIC: Denies depression, anxiety or mood swings.    PHYSICAL EXAM:    APPEARANCE: Well nourished, well developed, in no acute distress.  AFFECT: WNL, alert and oriented x 3  SKIN: No acne or hirsutism  NECK: Neck symmetric without masses or thyromegaly  NODES: No inguinal, cervical, axillary, or femoral lymph node enlargement  CHEST: Good respiratory effect  ABDOMEN: Soft.  No tenderness or masses.  No hepatosplenomegaly.  No hernias.  BREASTS: Symmetrical, no skin changes or visible lesions.  No palpable masses, nipple discharge bilaterally.  PELVIC: Normal external genitalia without lesions.  Normal hair distribution.  Adequate perineal body, normal urethral meatus.  Vagina moist and well rugated without lesions or discharge.  Cervix pink, without lesions, discharge or tenderness.  No significant cystocele or rectocele.  Bimanual exam shows uterus to be normal size, regular, mobile and nontender.  Adnexa without masses or tenderness.    RECTAL: Rectovaginal exam confirms above with normal sphincter tone, no masses.  EXTREMITIES: No edema.      ICD-10-CM ICD-9-CM    1. Encounter for annual routine gynecological examination  Z01.419 V72.31    2. Pap smear for cervical cancer screening  Z12.4 V76.2 Liquid-Based Pap Smear, Screening   3. Dysplasia of cervix, low grade (ROWDY 1)  N87.0 622.11 Liquid-Based Pap Smear, Screening     Assessment plan:    1. Pap smear done, history of ROWDY 1  2. Mammogram ordered  3. Jeanette menopausal  Discussed expectations in the Jeanette menopausal period.  Discussed risks and benefits of HRT, declines for now.  Discussed significance of postmenopausal bleeding, especially in light of her maternal aunt with a history of uterine cancer  Return 1 year or as needed

## 2022-06-11 LAB
FINAL PATHOLOGIC DIAGNOSIS: NORMAL
Lab: NORMAL